# Patient Record
Sex: MALE | Race: WHITE | NOT HISPANIC OR LATINO
[De-identification: names, ages, dates, MRNs, and addresses within clinical notes are randomized per-mention and may not be internally consistent; named-entity substitution may affect disease eponyms.]

---

## 2019-05-13 ENCOUNTER — APPOINTMENT (OUTPATIENT)
Dept: CARDIOLOGY | Facility: CLINIC | Age: 44
End: 2019-05-13

## 2019-07-16 ENCOUNTER — INPATIENT (INPATIENT)
Facility: HOSPITAL | Age: 44
LOS: 0 days | Discharge: HOME | End: 2019-07-17
Attending: ORTHOPAEDIC SURGERY | Admitting: ORTHOPAEDIC SURGERY
Payer: COMMERCIAL

## 2019-07-16 VITALS
TEMPERATURE: 99 F | OXYGEN SATURATION: 98 % | HEIGHT: 68 IN | RESPIRATION RATE: 18 BRPM | WEIGHT: 199.96 LBS | HEART RATE: 86 BPM | DIASTOLIC BLOOD PRESSURE: 88 MMHG | SYSTOLIC BLOOD PRESSURE: 120 MMHG

## 2019-07-16 VITALS
RESPIRATION RATE: 18 BRPM | SYSTOLIC BLOOD PRESSURE: 105 MMHG | TEMPERATURE: 98 F | OXYGEN SATURATION: 100 % | HEART RATE: 61 BPM | DIASTOLIC BLOOD PRESSURE: 51 MMHG

## 2019-07-16 LAB
ABO RH CONFIRMATION: SIGNIFICANT CHANGE UP
ANION GAP SERPL CALC-SCNC: 5 MMOL/L — SIGNIFICANT CHANGE UP (ref 5–17)
APTT BLD: 30.4 SEC — SIGNIFICANT CHANGE UP (ref 27.5–36.3)
BASOPHILS # BLD AUTO: 0.07 K/UL — SIGNIFICANT CHANGE UP (ref 0–0.2)
BASOPHILS NFR BLD AUTO: 0.7 % — SIGNIFICANT CHANGE UP (ref 0–2)
BLD GP AB SCN SERPL QL: SIGNIFICANT CHANGE UP
BUN SERPL-MCNC: 12 MG/DL — SIGNIFICANT CHANGE UP (ref 7–23)
CALCIUM SERPL-MCNC: 8.3 MG/DL — LOW (ref 8.5–10.1)
CHLORIDE SERPL-SCNC: 106 MMOL/L — SIGNIFICANT CHANGE UP (ref 96–108)
CO2 SERPL-SCNC: 29 MMOL/L — SIGNIFICANT CHANGE UP (ref 22–31)
CREAT SERPL-MCNC: 1.08 MG/DL — SIGNIFICANT CHANGE UP (ref 0.5–1.3)
CRP SERPL-MCNC: 0.17 MG/DL — SIGNIFICANT CHANGE UP (ref 0–0.4)
EOSINOPHIL # BLD AUTO: 0.11 K/UL — SIGNIFICANT CHANGE UP (ref 0–0.5)
EOSINOPHIL NFR BLD AUTO: 1.1 % — SIGNIFICANT CHANGE UP (ref 0–6)
ERYTHROCYTE [SEDIMENTATION RATE] IN BLOOD: 4 MM/HR — SIGNIFICANT CHANGE UP (ref 0–15)
GLUCOSE SERPL-MCNC: 105 MG/DL — HIGH (ref 70–99)
HCT VFR BLD CALC: 44.2 % — SIGNIFICANT CHANGE UP (ref 39–50)
HGB BLD-MCNC: 15.1 G/DL — SIGNIFICANT CHANGE UP (ref 13–17)
IMM GRANULOCYTES NFR BLD AUTO: 0.3 % — SIGNIFICANT CHANGE UP (ref 0–1.5)
INR BLD: 1.01 RATIO — SIGNIFICANT CHANGE UP (ref 0.88–1.16)
LYMPHOCYTES # BLD AUTO: 1.95 K/UL — SIGNIFICANT CHANGE UP (ref 1–3.3)
LYMPHOCYTES # BLD AUTO: 18.8 % — SIGNIFICANT CHANGE UP (ref 13–44)
MCHC RBC-ENTMCNC: 29.8 PG — SIGNIFICANT CHANGE UP (ref 27–34)
MCHC RBC-ENTMCNC: 34.2 GM/DL — SIGNIFICANT CHANGE UP (ref 32–36)
MCV RBC AUTO: 87.4 FL — SIGNIFICANT CHANGE UP (ref 80–100)
MONOCYTES # BLD AUTO: 0.69 K/UL — SIGNIFICANT CHANGE UP (ref 0–0.9)
MONOCYTES NFR BLD AUTO: 6.7 % — SIGNIFICANT CHANGE UP (ref 2–14)
NEUTROPHILS # BLD AUTO: 7.51 K/UL — HIGH (ref 1.8–7.4)
NEUTROPHILS NFR BLD AUTO: 72.4 % — SIGNIFICANT CHANGE UP (ref 43–77)
PLATELET # BLD AUTO: 225 K/UL — SIGNIFICANT CHANGE UP (ref 150–400)
POTASSIUM SERPL-MCNC: 4 MMOL/L — SIGNIFICANT CHANGE UP (ref 3.5–5.3)
POTASSIUM SERPL-SCNC: 4 MMOL/L — SIGNIFICANT CHANGE UP (ref 3.5–5.3)
PROTHROM AB SERPL-ACNC: 11.2 SEC — SIGNIFICANT CHANGE UP (ref 10–12.9)
RBC # BLD: 5.06 M/UL — SIGNIFICANT CHANGE UP (ref 4.2–5.8)
RBC # FLD: 12.5 % — SIGNIFICANT CHANGE UP (ref 10.3–14.5)
SODIUM SERPL-SCNC: 140 MMOL/L — SIGNIFICANT CHANGE UP (ref 135–145)
TYPE + AB SCN PNL BLD: SIGNIFICANT CHANGE UP
WBC # BLD: 10.36 K/UL — SIGNIFICANT CHANGE UP (ref 3.8–10.5)
WBC # FLD AUTO: 10.36 K/UL — SIGNIFICANT CHANGE UP (ref 3.8–10.5)

## 2019-07-16 PROCEDURE — 71045 X-RAY EXAM CHEST 1 VIEW: CPT | Mod: 26

## 2019-07-16 PROCEDURE — 93010 ELECTROCARDIOGRAM REPORT: CPT

## 2019-07-16 PROCEDURE — 99285 EMERGENCY DEPT VISIT HI MDM: CPT

## 2019-07-16 PROCEDURE — 73130 X-RAY EXAM OF HAND: CPT | Mod: 26,LT

## 2019-07-16 RX ORDER — SIMVASTATIN 20 MG/1
40 TABLET, FILM COATED ORAL AT BEDTIME
Refills: 0 | Status: DISCONTINUED | OUTPATIENT
Start: 2019-07-16 | End: 2019-07-17

## 2019-07-16 RX ORDER — MORPHINE SULFATE 50 MG/1
4 CAPSULE, EXTENDED RELEASE ORAL ONCE
Refills: 0 | Status: DISCONTINUED | OUTPATIENT
Start: 2019-07-16 | End: 2019-07-16

## 2019-07-16 RX ORDER — TETANUS TOXOID, REDUCED DIPHTHERIA TOXOID AND ACELLULAR PERTUSSIS VACCINE, ADSORBED 5; 2.5; 8; 8; 2.5 [IU]/.5ML; [IU]/.5ML; UG/.5ML; UG/.5ML; UG/.5ML
0.5 SUSPENSION INTRAMUSCULAR ONCE
Refills: 0 | Status: COMPLETED | OUTPATIENT
Start: 2019-07-16 | End: 2019-07-16

## 2019-07-16 RX ORDER — PANTOPRAZOLE SODIUM 20 MG/1
40 TABLET, DELAYED RELEASE ORAL
Refills: 0 | Status: DISCONTINUED | OUTPATIENT
Start: 2019-07-16 | End: 2019-07-17

## 2019-07-16 RX ORDER — PANTOPRAZOLE SODIUM 20 MG/1
1 TABLET, DELAYED RELEASE ORAL
Qty: 0 | Refills: 0 | DISCHARGE

## 2019-07-16 RX ORDER — CITALOPRAM 10 MG/1
20 TABLET, FILM COATED ORAL DAILY
Refills: 0 | Status: DISCONTINUED | OUTPATIENT
Start: 2019-07-16 | End: 2019-07-17

## 2019-07-16 RX ORDER — OXYCODONE HYDROCHLORIDE 5 MG/1
10 TABLET ORAL EVERY 4 HOURS
Refills: 0 | Status: DISCONTINUED | OUTPATIENT
Start: 2019-07-16 | End: 2019-07-17

## 2019-07-16 RX ORDER — SIMVASTATIN 20 MG/1
1 TABLET, FILM COATED ORAL
Qty: 0 | Refills: 0 | DISCHARGE

## 2019-07-16 RX ORDER — VANCOMYCIN HCL 1 G
1500 VIAL (EA) INTRAVENOUS EVERY 12 HOURS
Refills: 0 | Status: DISCONTINUED | OUTPATIENT
Start: 2019-07-16 | End: 2019-07-17

## 2019-07-16 RX ORDER — KETOROLAC TROMETHAMINE 30 MG/ML
15 SYRINGE (ML) INJECTION ONCE
Refills: 0 | Status: DISCONTINUED | OUTPATIENT
Start: 2019-07-16 | End: 2019-07-16

## 2019-07-16 RX ORDER — DOCUSATE SODIUM 100 MG
100 CAPSULE ORAL THREE TIMES A DAY
Refills: 0 | Status: DISCONTINUED | OUTPATIENT
Start: 2019-07-16 | End: 2019-07-17

## 2019-07-16 RX ORDER — DIVALPROEX SODIUM 500 MG/1
500 TABLET, DELAYED RELEASE ORAL ONCE
Refills: 0 | Status: COMPLETED | OUTPATIENT
Start: 2019-07-16 | End: 2019-07-16

## 2019-07-16 RX ORDER — PIPERACILLIN AND TAZOBACTAM 4; .5 G/20ML; G/20ML
3.38 INJECTION, POWDER, LYOPHILIZED, FOR SOLUTION INTRAVENOUS ONCE
Refills: 0 | Status: COMPLETED | OUTPATIENT
Start: 2019-07-16 | End: 2019-07-16

## 2019-07-16 RX ORDER — ACETAMINOPHEN 500 MG
975 TABLET ORAL ONCE
Refills: 0 | Status: COMPLETED | OUTPATIENT
Start: 2019-07-16 | End: 2019-07-16

## 2019-07-16 RX ORDER — ARIPIPRAZOLE 15 MG/1
2 TABLET ORAL DAILY
Refills: 0 | Status: DISCONTINUED | OUTPATIENT
Start: 2019-07-16 | End: 2019-07-16

## 2019-07-16 RX ORDER — QUETIAPINE FUMARATE 200 MG/1
50 TABLET, FILM COATED ORAL ONCE
Refills: 0 | Status: COMPLETED | OUTPATIENT
Start: 2019-07-16 | End: 2019-07-16

## 2019-07-16 RX ORDER — ARIPIPRAZOLE 15 MG/1
2 TABLET ORAL
Qty: 0 | Refills: 0 | DISCHARGE

## 2019-07-16 RX ORDER — PIPERACILLIN AND TAZOBACTAM 4; .5 G/20ML; G/20ML
3.38 INJECTION, POWDER, LYOPHILIZED, FOR SOLUTION INTRAVENOUS EVERY 8 HOURS
Refills: 0 | Status: DISCONTINUED | OUTPATIENT
Start: 2019-07-16 | End: 2019-07-17

## 2019-07-16 RX ORDER — ARIPIPRAZOLE 15 MG/1
4 TABLET ORAL DAILY
Refills: 0 | Status: DISCONTINUED | OUTPATIENT
Start: 2019-07-16 | End: 2019-07-17

## 2019-07-16 RX ORDER — OXYCODONE HYDROCHLORIDE 5 MG/1
5 TABLET ORAL EVERY 4 HOURS
Refills: 0 | Status: DISCONTINUED | OUTPATIENT
Start: 2019-07-16 | End: 2019-07-17

## 2019-07-16 RX ORDER — IBUPROFEN 200 MG
600 TABLET ORAL EVERY 8 HOURS
Refills: 0 | Status: DISCONTINUED | OUTPATIENT
Start: 2019-07-16 | End: 2019-07-17

## 2019-07-16 RX ORDER — IBUPROFEN 200 MG
600 TABLET ORAL ONCE
Refills: 0 | Status: COMPLETED | OUTPATIENT
Start: 2019-07-16 | End: 2019-07-16

## 2019-07-16 RX ORDER — CITALOPRAM 10 MG/1
1 TABLET, FILM COATED ORAL
Qty: 0 | Refills: 0 | DISCHARGE

## 2019-07-16 RX ADMIN — PIPERACILLIN AND TAZOBACTAM 25 GRAM(S): 4; .5 INJECTION, POWDER, LYOPHILIZED, FOR SOLUTION INTRAVENOUS at 21:27

## 2019-07-16 RX ADMIN — Medication 15 MILLIGRAM(S): at 10:30

## 2019-07-16 RX ADMIN — Medication 975 MILLIGRAM(S): at 01:03

## 2019-07-16 RX ADMIN — Medication 15 MILLIGRAM(S): at 07:47

## 2019-07-16 RX ADMIN — ARIPIPRAZOLE 4 MILLIGRAM(S): 15 TABLET ORAL at 19:36

## 2019-07-16 RX ADMIN — PIPERACILLIN AND TAZOBACTAM 200 GRAM(S): 4; .5 INJECTION, POWDER, LYOPHILIZED, FOR SOLUTION INTRAVENOUS at 13:31

## 2019-07-16 RX ADMIN — Medication 600 MILLIGRAM(S): at 04:57

## 2019-07-16 RX ADMIN — Medication 300 MILLIGRAM(S): at 15:38

## 2019-07-16 RX ADMIN — Medication 100 MILLIGRAM(S): at 21:27

## 2019-07-16 RX ADMIN — MORPHINE SULFATE 4 MILLIGRAM(S): 50 CAPSULE, EXTENDED RELEASE ORAL at 04:54

## 2019-07-16 RX ADMIN — PANTOPRAZOLE SODIUM 40 MILLIGRAM(S): 20 TABLET, DELAYED RELEASE ORAL at 19:36

## 2019-07-16 RX ADMIN — Medication 975 MILLIGRAM(S): at 07:47

## 2019-07-16 RX ADMIN — MORPHINE SULFATE 4 MILLIGRAM(S): 50 CAPSULE, EXTENDED RELEASE ORAL at 04:57

## 2019-07-16 RX ADMIN — Medication 600 MILLIGRAM(S): at 04:44

## 2019-07-16 RX ADMIN — TETANUS TOXOID, REDUCED DIPHTHERIA TOXOID AND ACELLULAR PERTUSSIS VACCINE, ADSORBED 0.5 MILLILITER(S): 5; 2.5; 8; 8; 2.5 SUSPENSION INTRAMUSCULAR at 04:44

## 2019-07-16 RX ADMIN — Medication 600 MILLIGRAM(S): at 18:18

## 2019-07-16 RX ADMIN — CITALOPRAM 20 MILLIGRAM(S): 10 TABLET, FILM COATED ORAL at 18:19

## 2019-07-16 RX ADMIN — SIMVASTATIN 40 MILLIGRAM(S): 20 TABLET, FILM COATED ORAL at 21:27

## 2019-07-16 NOTE — H&P ADULT - NSHPPHYSICALEXAM_GEN_ALL_CORE
O:  T(C): 36.9 (07-16-19 @ 10:32), Max: 37.1 (07-16-19 @ 04:09)  HR: 63 (07-16-19 @ 10:32) (62 - 86)  BP: 117/67 (07-16-19 @ 10:32) (111/63 - 120/88)  RR: 18 (07-16-19 @ 10:32) (18 - 18)  SpO2: 99% (07-16-19 @ 10:32) (97% - 99%)    Gen: NAD.  LUE:   healing small longtudinal laceration over the flexor side of index finger at the level of the PIP flexion crease.   No redness or drainage.   Mild/moderate swelling.   TTP about laceration site with minor surrounding pain about the flexor tendon sheath  Able to passively stretch to full extension. However, has diffuse pain about the flexor side with passive stretch.   intact flexor tendon.   AIN/PIN/U Intact  SILT M/U/R  Radial pulse palpable 2+    Secondary Survey: No TTP over bony prominences, SILT, palpable pulses, full/painless range of motion, compartments soft      Imaging: XR L hand and forearm. no acute fracture. minor soft tissue swelling noted.

## 2019-07-16 NOTE — ED ADULT TRIAGE NOTE - CHIEF COMPLAINT QUOTE
c/o pain & difficulty moving left 1st finger(index finger) starting this morning Pt reports cutting finger last week. Skin intact.

## 2019-07-16 NOTE — ED ADULT NURSE NOTE - ED STAT RN HANDOFF DETAILS
received pt from prior RN alert and oriented. Updated on POC and verbalizes understanding.  Awaiting further results and ortho at this time.  Will cont to monitor.

## 2019-07-16 NOTE — H&P ADULT - HISTORY OF PRESENT ILLNESS
HPI: 44y year old Male RHD s/p laceration from glass to L index finger over flexor side about 1 week ago. States symptoms were improving with mild pain, but golfed yesterday and woke up this am with worsening pain. Pain diffusely about finger. Painful to extend digit. No drainage or redness. No fevers or chills. Otherwise feels well. Patient denies pain in any other joint, numbness, tingling, paresthesias. No pain in any other extremities.

## 2019-07-16 NOTE — ED ADULT NURSE NOTE - NSIMPLEMENTINTERV_GEN_ALL_ED
Implemented All Universal Safety Interventions:  [place] to call system. Call bell, personal items and telephone within reach. Instruct patient to call for assistance. Room bathroom lighting operational. Non-slip footwear when patient is off stretcher. Physically safe environment: no spills, clutter or unnecessary equipment. Stretcher in lowest position, wheels locked, appropriate side rails in place.

## 2019-07-16 NOTE — CONSULT NOTE ADULT - SUBJECTIVE AND OBJECTIVE BOX
c/c: left index finger pain/swelling/redness    HPI: 44M, pmh of depression, gerd, HLD who presented to the ER with acute left first digit pain/swelling X 1 day. He had cut his finger about a week ago on glass. Subsequently thought it was healing well. He even played golf. Yesterday, he had acute onset pain/swelling and redness. He came to the ED and is now admitted to ortho with early flexor tenosynovitis. Hospitalist service consulted for medical comanagement, clearance for possible OR. Pt feels better after receiving pain meds. No fevers but had chills last night. No n/v/d/abd abd pain. no cp/sob. Able to climb flights of stairs without difficulty. Does not exercise regularly. no h/o htn, dm, stroke.     ROS: all 10 systems reviewed and is as above otherwise negative.     PMH as above  PSH: right shoulder surgery  F/H: denies significant medical conditions in immediate family members  Social: no tobacco use, has a beer daily  Allergies: NKDA  Home meds: see med rec    Vital Signs Last 24 Hrs  T(C): 37 (16 Jul 2019 15:10), Max: 37.1 (16 Jul 2019 04:09)  T(F): 98.6 (16 Jul 2019 15:10), Max: 98.7 (16 Jul 2019 04:09)  HR: 62 (16 Jul 2019 15:10) (60 - 86)  BP: 97/70 (16 Jul 2019 15:10) (97/70 - 121/81)  RR: 17 (16 Jul 2019 15:10) (16 - 18)  SpO2: 100% (16 Jul 2019 15:10) (97% - 100%)    PHYSICAL EXAM:    GENERAL: Comfortable, no acute distress   HEAD:  Normocephalic, atraumatic  EYES: EOMI, PERRLA  HEENT: Moist mucous membranes  NECK: Supple, No JVD  NERVOUS SYSTEM:  Alert & Oriented X3, Motor Strength 5/5 B/L upper and lower extremities  CHEST/LUNG: Clear to auscultation bilaterally  HEART: Regular rate and rhythm  ABDOMEN: Soft, Nontender, Nondistended, Bowel sounds present  GENITOURINARY: Voiding, no palpable bladder  EXTREMITIES:   No clubbing, cyanosis, or edema  MUSCULOSKELTAL- Left index finger with healing laceration, redness/swelling/tenderness, difficulty with flexion  SKIN-no rash    LABS:                        15.1   10.36 )-----------( 225      ( 16 Jul 2019 04:48 )             44.2     07-16    140  |  106  |  12  ----------------------------<  105<H>  4.0   |  29  |  1.08    Ca    8.3<L>      16 Jul 2019 04:48      PT/INR - ( 16 Jul 2019 10:11 )   PT: 11.2 sec;   INR: 1.01 ratio         PTT - ( 16 Jul 2019 10:11 )  PTT:30.4 sec    ekg: sinus, no acute ischemic changes     Xray Hand 3 Views, Left (07.16.19 @ 09:53) >  IMPRESSION:     No evidence of acute bony articular abnormality in the left second digit   or left hand.

## 2019-07-16 NOTE — H&P ADULT - ASSESSMENT
A/P 44M with L index finger early FTS     Potentially early FTS given mechanism of injury, pain with passive stretch and spreading pain distal/proximal to laceration site about the flexor side of digit. Mild fusiform swelling. No redness or drainage. No fevers or chills. Unlikely to benefit at this time for I+D. However, will admit for IV abx and close monitoring. If any clinical signs of worsening condition or lack of improvement overnight, will potentially pursue I+D tomorrow in the OR.   Pain Control, nsaids  Start Vanco/Zosyn  Hold AC  NPO after midnight tonight  Ice, elevate LUE  Will monitor as noted.   DW Dr. Ramírez, agrees with above.

## 2019-07-16 NOTE — ED ADULT NURSE NOTE - OBJECTIVE STATEMENT
Pt comes in with left hand swelling. Pt states he cut his hand earlier in the week on glass. Pt states that he went golfing today and prior to golf, pt had no pain or swelling, after golf is when pain and swelling started. Pt denies trauma to area, denies fevers.

## 2019-07-16 NOTE — ED PROVIDER NOTE - CLINICAL SUMMARY MEDICAL DECISION MAKING FREE TEXT BOX
pt with swelling and ttp of left index finger s/p cutting on glass 3 days ago, will get xray r/o fb, give pain medication and get hand consult for possible flexor tenosynovitis, tdap and antibiotics

## 2019-07-16 NOTE — ED PROVIDER NOTE - OBJECTIVE STATEMENT
45 yo male pw left index finger pain and swelling. 3 days ago pt cut finger on a glass candle vogt, he played golf today and woke up in excruciating pain, unable to move the finger. no fever or chills

## 2019-07-16 NOTE — ED PROVIDER NOTE - MUSCULOSKELETAL, MLM
Spine appears normal, range of motion is not limited,  left index finger in slightly flexed position, pain with passive extension, superficial laceration with no erythema or drainage to volar aspect of finger. +2 radial pulse and swelling to index

## 2019-07-16 NOTE — ED ADULT NURSE REASSESSMENT NOTE - NS ED NURSE REASSESS COMMENT FT1
pt agitated, unsure of plan. call out to ortho placed to speak with patient. Management made aware. will place another call to ortho. MD Bone at bedside, will place admission

## 2019-07-16 NOTE — PATIENT PROFILE ADULT - MONEY FOR FOOD
Naomi Adan NP came to assess patient at bedside. Instructed to check patient's bleeding in 1 hour. NP said to keep monitoring as she was seen by NP on the 21st. No further action required no

## 2019-07-16 NOTE — ED ADULT NURSE REASSESSMENT NOTE - NS ED NURSE REASSESS COMMENT FT1
Patient received from previous nurse. Pt is A&Ox4, VSS on RA. Pt is resting comfortably in their bed at this time, denies any pain or discomfort at this time. Pt stated that he is able to bend his finger without any pain at this time. Safety and comfort measures in place. Hourly rounding will be done on my time. Will continue to monitor.

## 2019-07-16 NOTE — ED ADULT NURSE REASSESSMENT NOTE - NS ED NURSE REASSESS COMMENT FT1
Ortho at bedside, ortho spoke with patient with 3 options, told patient they will come back to speak with patient. Spoke again with patient wife. call out to ortho for plan.

## 2019-07-16 NOTE — ED PROVIDER NOTE - PROGRESS NOTE DETAILS
spoke with Dr. Ramírez on call for hand, paged ortho resident concern for flexortenosynovitis. recommended to hold antibiotics for now. no fever, no redness. NORMA Duran DO pt endorsed to me from dr hernandez, ortho will admit for possible flexor tenosynovitis

## 2019-07-16 NOTE — CONSULT NOTE ADULT - ASSESSMENT
44M, pmh as above a/w:    1. Left index finger tenosynovitis:  -pain control  -nsaids  -abx  -possible or if no improvement.   -if OR needed, no medical contraindications at this time.     2. Depression:  -mood stable  -continue abilify/citalopram    3. HLD:  -statin    4. GERD:  -PPI    5. DVT px    thank you will follow

## 2019-07-16 NOTE — ED ADULT NURSE REASSESSMENT NOTE - NS ED NURSE REASSESS COMMENT FT1
Pt is resting in his bed at this time. Hand MD coming to evaluate the patient. Offered patient a menu pt stated "I will not eat hospital food, I will eat tomorrow." Safety and comfort measures in place. Hourly rounding will be done on my time. Will continue to monitor.

## 2019-07-16 NOTE — ED ADULT NURSE REASSESSMENT NOTE - NS ED NURSE REASSESS COMMENT FT1
Patient resting comfortably in bed no complaints any pain or discomfort. VSS. Iv intact and patent. Will continue to monitor

## 2019-07-17 ENCOUNTER — TRANSCRIPTION ENCOUNTER (OUTPATIENT)
Age: 44
End: 2019-07-17

## 2019-07-17 LAB
ANION GAP SERPL CALC-SCNC: 10 MMOL/L — SIGNIFICANT CHANGE UP (ref 5–17)
APTT BLD: 30.5 SEC — SIGNIFICANT CHANGE UP (ref 27.5–36.3)
BUN SERPL-MCNC: 12 MG/DL — SIGNIFICANT CHANGE UP (ref 7–23)
CALCIUM SERPL-MCNC: 8.7 MG/DL — SIGNIFICANT CHANGE UP (ref 8.5–10.1)
CHLORIDE SERPL-SCNC: 105 MMOL/L — SIGNIFICANT CHANGE UP (ref 96–108)
CO2 SERPL-SCNC: 27 MMOL/L — SIGNIFICANT CHANGE UP (ref 22–31)
CREAT SERPL-MCNC: 1.34 MG/DL — HIGH (ref 0.5–1.3)
GLUCOSE SERPL-MCNC: 143 MG/DL — HIGH (ref 70–99)
HCT VFR BLD CALC: 43.9 % — SIGNIFICANT CHANGE UP (ref 39–50)
HGB BLD-MCNC: 15 G/DL — SIGNIFICANT CHANGE UP (ref 13–17)
INR BLD: 1.03 RATIO — SIGNIFICANT CHANGE UP (ref 0.88–1.16)
MCHC RBC-ENTMCNC: 29.8 PG — SIGNIFICANT CHANGE UP (ref 27–34)
MCHC RBC-ENTMCNC: 34.2 GM/DL — SIGNIFICANT CHANGE UP (ref 32–36)
MCV RBC AUTO: 87.3 FL — SIGNIFICANT CHANGE UP (ref 80–100)
PLATELET # BLD AUTO: 185 K/UL — SIGNIFICANT CHANGE UP (ref 150–400)
POTASSIUM SERPL-MCNC: 4 MMOL/L — SIGNIFICANT CHANGE UP (ref 3.5–5.3)
POTASSIUM SERPL-SCNC: 4 MMOL/L — SIGNIFICANT CHANGE UP (ref 3.5–5.3)
PROTHROM AB SERPL-ACNC: 11.4 SEC — SIGNIFICANT CHANGE UP (ref 10–12.9)
RBC # BLD: 5.03 M/UL — SIGNIFICANT CHANGE UP (ref 4.2–5.8)
RBC # FLD: 12.6 % — SIGNIFICANT CHANGE UP (ref 10.3–14.5)
SODIUM SERPL-SCNC: 142 MMOL/L — SIGNIFICANT CHANGE UP (ref 135–145)
WBC # BLD: 7.26 K/UL — SIGNIFICANT CHANGE UP (ref 3.8–10.5)
WBC # FLD AUTO: 7.26 K/UL — SIGNIFICANT CHANGE UP (ref 3.8–10.5)

## 2019-07-17 RX ORDER — AZTREONAM 2 G
1 VIAL (EA) INJECTION
Qty: 20 | Refills: 0
Start: 2019-07-17 | End: 2019-07-26

## 2019-07-17 RX ORDER — ONDANSETRON 8 MG/1
4 TABLET, FILM COATED ORAL ONCE
Refills: 0 | Status: COMPLETED | OUTPATIENT
Start: 2019-07-17 | End: 2019-07-17

## 2019-07-17 RX ADMIN — Medication 600 MILLIGRAM(S): at 05:30

## 2019-07-17 RX ADMIN — Medication 600 MILLIGRAM(S): at 05:23

## 2019-07-17 RX ADMIN — Medication 300 MILLIGRAM(S): at 02:13

## 2019-07-17 RX ADMIN — PIPERACILLIN AND TAZOBACTAM 25 GRAM(S): 4; .5 INJECTION, POWDER, LYOPHILIZED, FOR SOLUTION INTRAVENOUS at 05:23

## 2019-07-17 RX ADMIN — ONDANSETRON 4 MILLIGRAM(S): 8 TABLET, FILM COATED ORAL at 05:23

## 2019-07-17 RX ADMIN — Medication 100 MILLIGRAM(S): at 05:23

## 2019-07-17 NOTE — DISCHARGE NOTE PROVIDER - NSDCCPCAREPLAN_GEN_ALL_CORE_FT
PRINCIPAL DISCHARGE DIAGNOSIS  Diagnosis: Flexor tenosynovitis of finger  Assessment and Plan of Treatment: 1. keep wound cdi  2. bactrim ds x 10 days  3. follow up dr. galloway in 3-4 days to monitor for improvement.   4. call with questions or worsening sx.

## 2019-07-17 NOTE — DISCHARGE NOTE PROVIDER - HOSPITAL COURSE
The patient is a 44 year old M admitted for IV abx and monitoring for L index finger early FTS. Started on vanco/zosyn for broad spectrum abx. Improved on HD #2 without any concerns for surgical necessity. Seen by Dr. Ramírez, advised for DC home with bactrim.

## 2019-07-17 NOTE — PROGRESS NOTE ADULT - SUBJECTIVE AND OBJECTIVE BOX
No new complaints.   Pain slightly improved.  No overnight events.     Vital Signs Last 24 Hrs  T(C): 36.8 (16 Jul 2019 18:55), Max: 37.1 (16 Jul 2019 18:19)  T(F): 98.2 (16 Jul 2019 18:55), Max: 98.7 (16 Jul 2019 18:19)  HR: 61 (16 Jul 2019 18:55) (60 - 63)  BP: 105/51 (16 Jul 2019 18:55) (97/70 - 121/81)  BP(mean): --  RR: 18 (16 Jul 2019 18:55) (16 - 18)  SpO2: 100% (16 Jul 2019 18:55) (97% - 100%)      Physical Exam:     	Gen: NAD.  	LUE:   	healing small longtudinal laceration over the flexor side of index finger at the level of the PIP flexion crease.   	No redness or drainage.   	Mild swelling.   	TTP about laceration site with minor surrounding pain about the flexor tendon sheath  	Able to passively stretch to full extension. However, has diffuse pain about the flexor side with passive stretch.   	intact flexor tendon.   	AIN/PIN/U Intact  	SILT M/U/R  	Radial pulse palpable 2+    	Secondary Survey: No TTP over bony prominences, SILT, palpable pulses, full/painless range of motion, compartments soft      	Imaging: XR L hand and forearm. no acute fracture. minor soft tissue swelling noted.      A/P 44M with L index finger early FTS     Slightly improved clinically.   Pain Control, nsaids  Vanco/Zosyn  Hold AC  NPO   Ice, elevate LUE  Plan to see with Dr. Ramírez today to determine if any operative intervention. No new complaints.   Pain  improved.  No overnight events.     Vital Signs Last 24 Hrs  T(C): 36.8 (16 Jul 2019 18:55), Max: 37.1 (16 Jul 2019 18:19)  T(F): 98.2 (16 Jul 2019 18:55), Max: 98.7 (16 Jul 2019 18:19)  HR: 61 (16 Jul 2019 18:55) (60 - 63)  BP: 105/51 (16 Jul 2019 18:55) (97/70 - 121/81)  BP(mean): --  RR: 18 (16 Jul 2019 18:55) (16 - 18)  SpO2: 100% (16 Jul 2019 18:55) (97% - 100%)      Physical Exam:     	Gen: NAD.  	LUE:   	healing small longtudinal laceration over the flexor side of index finger at the level of the PIP flexion crease.   	No redness or drainage.   	Mild swelling.   	TTP about laceration site with minor surrounding pain about the flexor tendon sheath  	Able to passively stretch to full extension. However, has diffuse pain about the flexor side with passive stretch.   	intact flexor tendon.   	AIN/PIN/U Intact  	SILT M/U/R  	Radial pulse palpable 2+    	Secondary Survey: No TTP over bony prominences, SILT, palpable pulses, full/painless range of motion, compartments soft      	Imaging: XR L hand and forearm. no acute fracture. minor soft tissue swelling noted.      A/P 44M with L index finger early FTS     Improved clinically.   Pain Control, nsaids  Vanco/Zosyn finish dose this  Ice, elevate LUE  Seen with Dr. Ramírez. Much improved. Plan for DC home with bactrim x 10 days. Follow up in 3-4 days with Dr. Ramírez

## 2019-07-17 NOTE — DISCHARGE NOTE NURSING/CASE MANAGEMENT/SOCIAL WORK - NSDCDPATPORTLINK_GEN_ALL_CORE
You can access the [facility] Patient Portal, offered by [facility], by registering with the following website: [email or web address]

## 2019-07-17 NOTE — DISCHARGE NOTE PROVIDER - CARE PROVIDER_API CALL
Frankie Ramírez)  Orthopaedic Surgery  [address], [address]  [place] [address]  Phone: [phone]  Fax: [phone]  Follow Up Time:
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
## 2019-07-21 DIAGNOSIS — S66.111A: ICD-10-CM

## 2019-07-21 DIAGNOSIS — E78.5 HYPERLIPIDEMIA, UNSPECIFIED: ICD-10-CM
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
## 2019-07-21 DIAGNOSIS — K21.9 GASTRO-ESOPHAGEAL REFLUX DISEASE WITHOUT ESOPHAGITIS: ICD-10-CM

## 2019-07-21 DIAGNOSIS — F32.9 MAJOR DEPRESSIVE DISORDER, SINGLE EPISODE, UNSPECIFIED: ICD-10-CM

## 2019-07-21 LAB
CULTURE RESULTS: SIGNIFICANT CHANGE UP
CULTURE RESULTS: SIGNIFICANT CHANGE UP
SPECIMEN SOURCE: SIGNIFICANT CHANGE UP
SPECIMEN SOURCE: SIGNIFICANT CHANGE UP

## 2021-02-17 ENCOUNTER — OUTPATIENT (OUTPATIENT)
Dept: OUTPATIENT SERVICES | Facility: HOSPITAL | Age: 46
LOS: 1 days | End: 2021-02-17
Payer: COMMERCIAL

## 2021-02-17 DIAGNOSIS — Z20.828 CONTACT WITH AND (SUSPECTED) EXPOSURE TO OTHER VIRAL COMMUNICABLE DISEASES: ICD-10-CM

## 2021-02-17 LAB — SARS-COV-2 RNA SPEC QL NAA+PROBE: SIGNIFICANT CHANGE UP

## 2021-02-17 PROCEDURE — U0003: CPT

## 2021-02-17 PROCEDURE — C9803: CPT

## 2021-02-17 PROCEDURE — U0005: CPT

## 2021-02-18 DIAGNOSIS — Z20.828 CONTACT WITH AND (SUSPECTED) EXPOSURE TO OTHER VIRAL COMMUNICABLE DISEASES: ICD-10-CM

## 2021-02-20 ENCOUNTER — OUTPATIENT (OUTPATIENT)
Dept: OUTPATIENT SERVICES | Facility: HOSPITAL | Age: 46
LOS: 1 days | End: 2021-02-20
Payer: COMMERCIAL

## 2021-02-20 DIAGNOSIS — Z20.828 CONTACT WITH AND (SUSPECTED) EXPOSURE TO OTHER VIRAL COMMUNICABLE DISEASES: ICD-10-CM

## 2021-02-20 LAB — SARS-COV-2 RNA SPEC QL NAA+PROBE: SIGNIFICANT CHANGE UP

## 2021-02-20 PROCEDURE — U0005: CPT

## 2021-02-20 PROCEDURE — C9803: CPT

## 2021-02-20 PROCEDURE — U0003: CPT

## 2021-02-21 DIAGNOSIS — Z20.828 CONTACT WITH AND (SUSPECTED) EXPOSURE TO OTHER VIRAL COMMUNICABLE DISEASES: ICD-10-CM

## 2021-02-23 ENCOUNTER — OUTPATIENT (OUTPATIENT)
Dept: OUTPATIENT SERVICES | Facility: HOSPITAL | Age: 46
LOS: 1 days | End: 2021-02-23
Payer: COMMERCIAL

## 2021-02-23 DIAGNOSIS — Z20.828 CONTACT WITH AND (SUSPECTED) EXPOSURE TO OTHER VIRAL COMMUNICABLE DISEASES: ICD-10-CM

## 2021-02-23 LAB — SARS-COV-2 RNA SPEC QL NAA+PROBE: DETECTED

## 2021-02-23 PROCEDURE — U0005: CPT

## 2021-02-23 PROCEDURE — U0003: CPT

## 2021-02-23 PROCEDURE — C9803: CPT

## 2021-02-24 DIAGNOSIS — Z20.828 CONTACT WITH AND (SUSPECTED) EXPOSURE TO OTHER VIRAL COMMUNICABLE DISEASES: ICD-10-CM

## 2021-03-16 ENCOUNTER — OUTPATIENT (OUTPATIENT)
Dept: OUTPATIENT SERVICES | Facility: HOSPITAL | Age: 46
LOS: 1 days | End: 2021-03-16
Payer: COMMERCIAL

## 2021-03-16 DIAGNOSIS — Z20.828 CONTACT WITH AND (SUSPECTED) EXPOSURE TO OTHER VIRAL COMMUNICABLE DISEASES: ICD-10-CM

## 2021-03-16 LAB — SARS-COV-2 RNA SPEC QL NAA+PROBE: DETECTED

## 2021-03-16 PROCEDURE — U0003: CPT

## 2021-03-16 PROCEDURE — C9803: CPT

## 2021-03-16 PROCEDURE — U0005: CPT

## 2021-03-17 DIAGNOSIS — Z20.828 CONTACT WITH AND (SUSPECTED) EXPOSURE TO OTHER VIRAL COMMUNICABLE DISEASES: ICD-10-CM

## 2021-03-26 ENCOUNTER — OUTPATIENT (OUTPATIENT)
Dept: OUTPATIENT SERVICES | Facility: HOSPITAL | Age: 46
LOS: 1 days | End: 2021-03-26
Payer: COMMERCIAL

## 2021-03-26 DIAGNOSIS — Z20.828 CONTACT WITH AND (SUSPECTED) EXPOSURE TO OTHER VIRAL COMMUNICABLE DISEASES: ICD-10-CM

## 2021-03-26 LAB — SARS-COV-2 RNA SPEC QL NAA+PROBE: SIGNIFICANT CHANGE UP

## 2021-03-26 PROCEDURE — U0005: CPT

## 2021-03-26 PROCEDURE — C9803: CPT

## 2021-03-26 PROCEDURE — U0003: CPT

## 2021-03-27 DIAGNOSIS — Z20.828 CONTACT WITH AND (SUSPECTED) EXPOSURE TO OTHER VIRAL COMMUNICABLE DISEASES: ICD-10-CM

## 2021-03-29 ENCOUNTER — OUTPATIENT (OUTPATIENT)
Dept: OUTPATIENT SERVICES | Facility: HOSPITAL | Age: 46
LOS: 1 days | End: 2021-03-29
Payer: COMMERCIAL

## 2021-03-29 DIAGNOSIS — Z20.828 CONTACT WITH AND (SUSPECTED) EXPOSURE TO OTHER VIRAL COMMUNICABLE DISEASES: ICD-10-CM

## 2021-03-29 LAB — SARS-COV-2 RNA SPEC QL NAA+PROBE: DETECTED

## 2021-03-29 PROCEDURE — C9803: CPT

## 2021-03-29 PROCEDURE — U0005: CPT

## 2021-03-29 PROCEDURE — U0003: CPT

## 2021-03-30 DIAGNOSIS — Z20.828 CONTACT WITH AND (SUSPECTED) EXPOSURE TO OTHER VIRAL COMMUNICABLE DISEASES: ICD-10-CM

## 2021-05-25 ENCOUNTER — NON-APPOINTMENT (OUTPATIENT)
Age: 46
End: 2021-05-25

## 2021-06-09 ENCOUNTER — APPOINTMENT (OUTPATIENT)
Dept: CARDIOLOGY | Facility: CLINIC | Age: 46
End: 2021-06-09

## 2021-10-06 ENCOUNTER — APPOINTMENT (OUTPATIENT)
Dept: CARDIOLOGY | Facility: CLINIC | Age: 46
End: 2021-10-06

## 2022-01-11 ENCOUNTER — APPOINTMENT (OUTPATIENT)
Dept: CARDIOLOGY | Facility: CLINIC | Age: 47
End: 2022-01-11

## 2022-02-07 NOTE — PATIENT PROFILE ADULT - SAFE PLACE TO LIVE
PLEASE CHANGE DRESSING ON 2/8/22. CLEAN INCISION WITH ALCOHOL AND APPLY NEW AQUACEL PROVIDED. LEAVE IN PLACE FOR 3 DAYS UNTIL 2/12/22. PLEASE BEGIN DAILY DRESSINGS ON 2/12/22.   PLEASE TAKE ELIQUIS AS ORDERED, THIS IS TO PREVENT BLOOD CLOTS, DO NOT TAKE THE ELIQUIS WITH ANY OTHER BLOOD THINNERS OR ASPIRIN.   PLEASE REFER TO HANDOUT FOR FURTHER INSTRUCTIONS  
no

## 2022-04-19 ENCOUNTER — APPOINTMENT (OUTPATIENT)
Dept: CARDIOLOGY | Facility: CLINIC | Age: 47
End: 2022-04-19
Payer: COMMERCIAL

## 2022-04-19 ENCOUNTER — NON-APPOINTMENT (OUTPATIENT)
Age: 47
End: 2022-04-19

## 2022-04-19 VITALS
BODY MASS INDEX: 27.28 KG/M2 | HEART RATE: 93 BPM | DIASTOLIC BLOOD PRESSURE: 68 MMHG | WEIGHT: 180 LBS | OXYGEN SATURATION: 97 % | HEIGHT: 68 IN | SYSTOLIC BLOOD PRESSURE: 104 MMHG

## 2022-04-19 DIAGNOSIS — F41.9 ANXIETY DISORDER, UNSPECIFIED: ICD-10-CM

## 2022-04-19 DIAGNOSIS — Z87.898 PERSONAL HISTORY OF OTHER SPECIFIED CONDITIONS: ICD-10-CM

## 2022-04-19 DIAGNOSIS — R09.89 OTHER SPECIFIED SYMPTOMS AND SIGNS INVOLVING THE CIRCULATORY AND RESPIRATORY SYSTEMS: ICD-10-CM

## 2022-04-19 DIAGNOSIS — R07.89 OTHER CHEST PAIN: ICD-10-CM

## 2022-04-19 PROCEDURE — 99215 OFFICE O/P EST HI 40 MIN: CPT

## 2022-04-19 PROCEDURE — 93000 ELECTROCARDIOGRAM COMPLETE: CPT

## 2022-04-19 RX ORDER — ARIPIPRAZOLE 5 MG/1
5 TABLET ORAL
Qty: 30 | Refills: 0 | Status: ACTIVE | COMMUNITY
Start: 2021-05-18

## 2022-04-26 ENCOUNTER — APPOINTMENT (OUTPATIENT)
Dept: CARDIOLOGY | Facility: CLINIC | Age: 47
End: 2022-04-26
Payer: COMMERCIAL

## 2022-04-26 LAB
25(OH)D3 SERPL-MCNC: 36.6 NG/ML
ALBUMIN SERPL ELPH-MCNC: 4.6 G/DL
ALP BLD-CCNC: 56 U/L
ALT SERPL-CCNC: 13 U/L
ANION GAP SERPL CALC-SCNC: 12 MMOL/L
AST SERPL-CCNC: 16 U/L
BASOPHILS # BLD AUTO: 0.09 K/UL
BASOPHILS NFR BLD AUTO: 1.6 %
BILIRUB SERPL-MCNC: 0.3 MG/DL
BUN SERPL-MCNC: 11 MG/DL
CALCIUM SERPL-MCNC: 9.4 MG/DL
CHLORIDE SERPL-SCNC: 105 MMOL/L
CHOLEST SERPL-MCNC: 234 MG/DL
CO2 SERPL-SCNC: 24 MMOL/L
CREAT SERPL-MCNC: 0.79 MG/DL
EGFR: 110 ML/MIN/1.73M2
EOSINOPHIL # BLD AUTO: 0.29 K/UL
EOSINOPHIL NFR BLD AUTO: 5.1 %
GLUCOSE SERPL-MCNC: 92 MG/DL
HCT VFR BLD CALC: 49.2 %
HDLC SERPL-MCNC: 59 MG/DL
HGB BLD-MCNC: 16 G/DL
IMM GRANULOCYTES NFR BLD AUTO: 0.4 %
LDLC SERPL CALC-MCNC: 152 MG/DL
LYMPHOCYTES # BLD AUTO: 2.02 K/UL
LYMPHOCYTES NFR BLD AUTO: 35.5 %
MAN DIFF?: NORMAL
MCHC RBC-ENTMCNC: 29.4 PG
MCHC RBC-ENTMCNC: 32.5 GM/DL
MCV RBC AUTO: 90.4 FL
MONOCYTES # BLD AUTO: 0.59 K/UL
MONOCYTES NFR BLD AUTO: 10.4 %
NEUTROPHILS # BLD AUTO: 2.68 K/UL
NEUTROPHILS NFR BLD AUTO: 47 %
NONHDLC SERPL-MCNC: 175 MG/DL
PLATELET # BLD AUTO: 274 K/UL
POTASSIUM SERPL-SCNC: 5 MMOL/L
PROT SERPL-MCNC: 6.7 G/DL
RBC # BLD: 5.44 M/UL
RBC # FLD: 13.3 %
SODIUM SERPL-SCNC: 141 MMOL/L
TRIGL SERPL-MCNC: 113 MG/DL
TSH SERPL-ACNC: 0.98 UIU/ML
WBC # FLD AUTO: 5.69 K/UL

## 2022-04-26 PROCEDURE — 93880 EXTRACRANIAL BILAT STUDY: CPT

## 2022-04-27 LAB
ESTIMATED AVERAGE GLUCOSE: 120 MG/DL
HBA1C MFR BLD HPLC: 5.8 %

## 2022-09-18 ENCOUNTER — EMERGENCY (EMERGENCY)
Facility: HOSPITAL | Age: 47
LOS: 0 days | Discharge: LEFT BEFORE DISCHARGE | End: 2022-09-18

## 2022-09-18 VITALS — WEIGHT: 169.98 LBS | HEIGHT: 68 IN

## 2022-09-18 VITALS
TEMPERATURE: 98 F | RESPIRATION RATE: 18 BRPM | DIASTOLIC BLOOD PRESSURE: 80 MMHG | HEART RATE: 85 BPM | SYSTOLIC BLOOD PRESSURE: 102 MMHG | OXYGEN SATURATION: 96 %

## 2022-09-18 DIAGNOSIS — M25.511 PAIN IN RIGHT SHOULDER: ICD-10-CM

## 2022-09-18 DIAGNOSIS — Z53.21 PROCEDURE AND TREATMENT NOT CARRIED OUT DUE TO PATIENT LEAVING PRIOR TO BEING SEEN BY HEALTH CARE PROVIDER: ICD-10-CM

## 2022-09-18 PROCEDURE — L9991: CPT

## 2022-09-18 NOTE — ED ADULT TRIAGE NOTE - CHIEF COMPLAINT QUOTE
PT C/O RIGHT SHOULDER PAIN. "I FEEL LIKE I HAVE A PINCHED NERVE. I CANNOT DRIVE A CAR OR MOVE WITHOUT PAIN". DENIES INJURY.

## 2022-09-18 NOTE — ED ADULT NURSE REASSESSMENT NOTE - NS ED NURSE REASSESS COMMENT FT1
pt left w/o being seen by RN (KERMIT Rodriguez) and by Dr. meek. Dr. meek made aware. Pt did not announce leaving. Charge RN ANTONI agrawal made aware. No IV in place.

## 2023-03-24 ENCOUNTER — APPOINTMENT (OUTPATIENT)
Dept: PULMONOLOGY | Facility: CLINIC | Age: 48
End: 2023-03-24
Payer: COMMERCIAL

## 2023-03-24 VITALS
HEART RATE: 83 BPM | OXYGEN SATURATION: 97 % | DIASTOLIC BLOOD PRESSURE: 78 MMHG | HEIGHT: 68 IN | BODY MASS INDEX: 28.64 KG/M2 | SYSTOLIC BLOOD PRESSURE: 113 MMHG | WEIGHT: 189 LBS

## 2023-03-24 DIAGNOSIS — F17.200 NICOTINE DEPENDENCE, UNSPECIFIED, UNCOMPLICATED: ICD-10-CM

## 2023-03-24 PROCEDURE — 99204 OFFICE O/P NEW MOD 45 MIN: CPT | Mod: 25

## 2023-03-24 PROCEDURE — ZZZZZ: CPT

## 2023-03-24 PROCEDURE — 94726 PLETHYSMOGRAPHY LUNG VOLUMES: CPT

## 2023-03-24 PROCEDURE — 71046 X-RAY EXAM CHEST 2 VIEWS: CPT

## 2023-03-24 PROCEDURE — 94729 DIFFUSING CAPACITY: CPT

## 2023-03-24 PROCEDURE — 94010 BREATHING CAPACITY TEST: CPT

## 2023-03-24 RX ORDER — PANTOPRAZOLE 40 MG/1
40 TABLET, DELAYED RELEASE ORAL
Refills: 0 | Status: ACTIVE | COMMUNITY

## 2023-03-24 NOTE — HISTORY OF PRESENT ILLNESS
[Current] : current [TextBox_4] : 48M with GERD, HLD\par \par active smoker, trying to quit\par here to eval lungs\par able to exercise\par no sob/cough/wheeze\par concerned bc COPD runs in his family\par no hospitalizations due to resp disease\par

## 2023-03-24 NOTE — ASSESSMENT
[FreeTextEntry1] : smoking cessation counseling \par fu 6 mo\par \par A total of 45 minutes was spent on this encounter including history taking, chart review, physical examination, testing interpretation and treatment plan.\par

## 2023-05-19 ENCOUNTER — RX RENEWAL (OUTPATIENT)
Age: 48
End: 2023-05-19

## 2023-05-30 ENCOUNTER — RX RENEWAL (OUTPATIENT)
Age: 48
End: 2023-05-30

## 2023-06-19 ENCOUNTER — RX RENEWAL (OUTPATIENT)
Age: 48
End: 2023-06-19

## 2023-06-27 ENCOUNTER — RX RENEWAL (OUTPATIENT)
Age: 48
End: 2023-06-27

## 2023-07-17 NOTE — PATIENT PROFILE ADULT - NSPROMUTANXFEARFT_GEN_A_NUR
Refill requested of valtrex.     Last office visit date: 01/20/23    Medication Refill Protocol Failed.  Failed criteria: pregnancy. Sent to clinician to review.           BP Readings from Last 3 Encounters:   07/14/23 130/76   07/11/23 134/82   07/05/23 138/88     Pulse Readings from Last 3 Encounters:   01/20/23 96   10/24/22 70   10/11/22 72       Hemoglobin A1C (%)   Date Value   02/02/2021 5.6     TSH (mcUnits/mL)   Date Value   05/18/2022 2.106     Lab Results   Component Value Date    CHOLESTEROL 239 (H) 05/18/2022    HDL 38 (L) 05/18/2022    CALCLDL 173 (H) 05/18/2022    TRIGLYCERIDE 138 05/18/2022     Lab Results   Component Value Date    SODIUM 134 (L) 05/16/2023    POTASSIUM 4.1 05/16/2023    CHLORIDE 105 05/16/2023    CO2 20 (L) 05/16/2023    BUN 8 05/16/2023    CREATININE 0.59 05/16/2023    GLUCOSE 121 (H) 05/16/2023     Lab Results   Component Value Date    AST 15 05/16/2023    GPT 18 05/16/2023    ALKPT 73 05/16/2023    BILIRUBIN 0.2 05/16/2023        N/A

## 2023-09-11 ENCOUNTER — APPOINTMENT (OUTPATIENT)
Dept: CARDIOLOGY | Facility: CLINIC | Age: 48
End: 2023-09-11
Payer: COMMERCIAL

## 2023-09-11 VITALS
BODY MASS INDEX: 29.25 KG/M2 | OXYGEN SATURATION: 97 % | DIASTOLIC BLOOD PRESSURE: 70 MMHG | HEIGHT: 68 IN | HEART RATE: 69 BPM | SYSTOLIC BLOOD PRESSURE: 108 MMHG | WEIGHT: 193 LBS

## 2023-09-11 DIAGNOSIS — R73.03 PREDIABETES.: ICD-10-CM

## 2023-09-11 DIAGNOSIS — E78.5 HYPERLIPIDEMIA, UNSPECIFIED: ICD-10-CM

## 2023-09-11 PROCEDURE — 99215 OFFICE O/P EST HI 40 MIN: CPT | Mod: 25

## 2023-09-11 PROCEDURE — 93000 ELECTROCARDIOGRAM COMPLETE: CPT

## 2023-09-11 RX ORDER — CITALOPRAM HYDROBROMIDE 20 MG/1
20 TABLET, FILM COATED ORAL DAILY
Refills: 0 | Status: DISCONTINUED | COMMUNITY
Start: 2021-05-18 | End: 2023-09-11

## 2023-09-18 ENCOUNTER — RX RENEWAL (OUTPATIENT)
Age: 48
End: 2023-09-18

## 2023-09-18 RX ORDER — ROSUVASTATIN CALCIUM 40 MG/1
40 TABLET, FILM COATED ORAL DAILY
Qty: 90 | Refills: 3 | Status: ACTIVE | COMMUNITY
Start: 2022-04-27

## 2023-10-19 ENCOUNTER — APPOINTMENT (OUTPATIENT)
Dept: CARDIOLOGY | Facility: CLINIC | Age: 48
End: 2023-10-19
Payer: COMMERCIAL

## 2023-10-19 DIAGNOSIS — R07.89 OTHER CHEST PAIN: ICD-10-CM

## 2023-10-19 DIAGNOSIS — R06.09 OTHER FORMS OF DYSPNEA: ICD-10-CM

## 2023-10-19 PROCEDURE — 93015 CV STRESS TEST SUPVJ I&R: CPT

## 2023-10-20 LAB
ALBUMIN SERPL ELPH-MCNC: 4.9 G/DL
ALP BLD-CCNC: 55 U/L
ALT SERPL-CCNC: 17 U/L
ANION GAP SERPL CALC-SCNC: 15 MMOL/L
AST SERPL-CCNC: 17 U/L
BILIRUB SERPL-MCNC: 0.8 MG/DL
BUN SERPL-MCNC: 12 MG/DL
CALCIUM SERPL-MCNC: 9.5 MG/DL
CHLORIDE SERPL-SCNC: 101 MMOL/L
CHOLEST SERPL-MCNC: 155 MG/DL
CO2 SERPL-SCNC: 25 MMOL/L
CREAT SERPL-MCNC: 1.07 MG/DL
EGFR: 86 ML/MIN/1.73M2
ESTIMATED AVERAGE GLUCOSE: 117 MG/DL
GLUCOSE SERPL-MCNC: 91 MG/DL
HBA1C MFR BLD HPLC: 5.7 %
HDLC SERPL-MCNC: 58 MG/DL
LDLC SERPL CALC-MCNC: 75 MG/DL
NONHDLC SERPL-MCNC: 97 MG/DL
POTASSIUM SERPL-SCNC: 4.6 MMOL/L
PROT SERPL-MCNC: 7 G/DL
SODIUM SERPL-SCNC: 141 MMOL/L
TRIGL SERPL-MCNC: 130 MG/DL

## 2024-06-14 NOTE — ED PROVIDER NOTE - PRINCIPAL DIAGNOSIS
Please inform pt he cannot mix narcotic pain medications with alprazolam or alcohol due to the potential for respiratory suppression   Flexor tenosynovitis of finger

## 2024-07-24 ENCOUNTER — APPOINTMENT (OUTPATIENT)
Dept: CARDIOLOGY | Facility: CLINIC | Age: 49
End: 2024-07-24
Payer: COMMERCIAL

## 2024-07-24 VITALS
BODY MASS INDEX: 30.01 KG/M2 | WEIGHT: 198 LBS | HEART RATE: 77 BPM | SYSTOLIC BLOOD PRESSURE: 107 MMHG | HEIGHT: 68 IN | OXYGEN SATURATION: 97 % | DIASTOLIC BLOOD PRESSURE: 72 MMHG

## 2024-07-24 DIAGNOSIS — R06.09 OTHER FORMS OF DYSPNEA: ICD-10-CM

## 2024-07-24 DIAGNOSIS — E78.5 HYPERLIPIDEMIA, UNSPECIFIED: ICD-10-CM

## 2024-07-24 PROCEDURE — 99215 OFFICE O/P EST HI 40 MIN: CPT | Mod: 25

## 2024-07-24 PROCEDURE — 93000 ELECTROCARDIOGRAM COMPLETE: CPT

## 2024-07-24 PROCEDURE — G2211 COMPLEX E/M VISIT ADD ON: CPT | Mod: NC

## 2024-07-24 RX ORDER — BUPROPION HYDROCHLORIDE 300 MG/1
300 TABLET, EXTENDED RELEASE ORAL DAILY
Refills: 0 | Status: ACTIVE | COMMUNITY

## 2024-07-24 RX ORDER — LAMOTRIGINE 25 MG/1
TABLET ORAL
Refills: 0 | Status: ACTIVE | COMMUNITY

## 2024-07-24 NOTE — HISTORY OF PRESENT ILLNESS
[FreeTextEntry1] : Mr. Jonas Coon presented to the office today for follow-up cardiac evaluation. He was accompanied to his visit here today by his wife, Akosua.  I last evaluated the patient in the office on 9/11/2023.  The patient is a 49-year-old male with a history of atypical chest discomfort, a dyslipidemia, pre-diabetes, anxiety, depression, and GERD.  The patient has been doing well from a cardiac symptomatic standpoint since his previous visit here on 9/11/2023.  Specifically, he does not describe having experienced any symptoms of chest discomfort which have been precipitated or exacerbated by physical activity.  He has not noted dyspnea on exertion in association with his activities, noting that he has recently resumed exercising.  He has not noted orthopnea, paroxysmal nocturnal dyspnea, or lower extremity edema.  He has not experienced any episodes of palpitations, presyncope or syncope.  As far as risk factors for coronary artery disease are concerned, the patient has a type IIa dyslipidemia and pre-diabetes. He does not have a history of hypertension. He discontinued cigarette smoking approximately 20 years ago, having previously smoked half a pack per day for a period of 8 years.  He resumed smoking up to a pack per day in late 2021, however, he reports having discontinued cigarette smoking entirely in December 2023.  He does not have a known immediate family history of premature coronary artery disease.  Laboratory studies performed on 10/19/2023 (on Crestor 40 mg daily) revealed cholesterol 155, triglycerides 130, HDL 58, and calculated LDL 75.  The liver chemistries were normal.  The BUN and creatinine were 12 and 1.07, respectively.  The potassium level was 4.6.  The glucose level was 91 and the hemoglobin A1c level was 5.7%.  Exercise stress testing most recently performed on 10/19/2023 revealed the patient to exhibit normal exercise tolerance, without the inducement of cardiac symptoms or electrocardiographic evidence of myocardial ischemia.  Occasional atrial premature contractions were exhibited.  Echocardiography most recently performed on 3/22/2016 revealed normal cardiac chamber sizes with normal left ventricular wall thickness and wall motion.  Left ventricular systolic function was normal, with an estimated ejection fraction of 65%.  Left ventricular diastolic function was normal.  Very mild mitral regurgitation was demonstrated.  Carotid artery Doppler testing most recently performed on 3/22/2016 revealed mild intimal thickening involving the bulbar regions bilaterally.  No significant atherosclerosis formation or stenoses were demonstrated.  Previous History:  The patient has a history of atypical chest discomfort, for which he was previously evaluated in the emergency room at [facility] in 2009 and the emergency room at [facility] on February 5, 2011. He was not found to have evidence for an acute coronary syndrome during either of these presentations, and was released from the emergency room on both occasions. I had initially evaluated the patient in the office in cardiology consultation on 3/1/11. At that time, he was referred here by his wife, Akosua, noting that his mother-in-law is also a patient of my practice (Mrs. Nhi Santillan).  Past medical history is otherwise significant for a history of prostatitis, for which he underwent a TUNA procedure a few years ago. He also reports having "interstitial cystitis". He underwent a left shoulder arthroscopic labrum repair a few years ago.

## 2024-07-24 NOTE — DISCUSSION/SUMMARY
[FreeTextEntry1] : Mr. Coon has a longstanding history of atypical chest discomfort and a dyslipidemia.  He has been doing well from a cardiac symptomatic standpoint since his previous visit here on 9/11/2023.  Specifically, he does not describe having experienced any signs or symptoms to suggest the development of an anginal syndrome, congestive heart failure, or a hemodynamically-compromising arrhythmia.  His cardiac examination today is remarkable for a faint systolic ejection murmur, in all likelihood representing an innocent flow murmur, and unchanged from his previous visit with me.  He is again exhibiting soft bilateral carotid bruits vs. a transmitted systolic cardiac murmur.  His blood pressure reading today is normal.  His electrocardiogram today reveals sinus rhythm with nonspecific diminished voltage in leads V3-V6, essentially unchanged from his previous office tracing, allowing for lead placement variation.  I have reviewed the findings of the exercise stress study of 10/19/2023, the echocardiogram of 3/22/2016, and the carotid artery Doppler study of 3/22/2016 in detail with the patient today.  I have reviewed the findings of the blood test report of 10/19/2023 in detail with the patient today.  I have advised him to continue Crestor 40 mg daily for the time being.  I am referring the patient for a follow-up fasting lipid profile and chemistry screen to assess the efficacy of the present dosage of Crestor in optimizing the lipid profile, as well as to assess for any potential adverse effects on the liver.  In addition, a hemoglobin A1c level will be obtained to reassess for pre-diabetes.  The importance of dietary modification, weight loss, and regular exercise was again emphasized to the patient today.  I have asked the patient to call me if he should have any questions or problems pertaining to these matters, and especially if he should experience any concerning symptoms. I have otherwise asked him to return to the office for follow-up cardiac evaluation in 6 months, provided he remains clinically stable in the interim. [EKG obtained to assist in diagnosis and management of assessed problem(s)] : EKG obtained to assist in diagnosis and management of assessed problem(s)

## 2024-07-24 NOTE — CARDIOLOGY SUMMARY
[de-identified] : 7/24/24 -sinus rhythm at a rate of 76 bpm.  Nonspecific diminished voltage associated with poor R wave progression in leads V3-V6.

## 2024-07-24 NOTE — PHYSICAL EXAM
[Normal Appearance] : normal appearance [General Appearance - In No Acute Distress] : no acute distress [Normal Conjunctiva] : the conjunctiva exhibited no abnormalities [No Oral Pallor] : no oral pallor [Normal Jugular Venous A Waves Present] : normal jugular venous A waves present [Normal Jugular Venous V Waves Present] : normal jugular venous V waves present [No Jugular Venous Chambers A Waves] : no jugular venous chambers A waves [Respiration, Rhythm And Depth] : normal respiratory rhythm and effort [Auscultation Breath Sounds / Voice Sounds] : lungs were clear to auscultation bilaterally [Bowel Sounds] : normal bowel sounds [Abdomen Tenderness] : non-tender [Abnormal Walk] : normal gait [Cyanosis, Localized] : no localized cyanosis [] : no rash [Oriented To Time, Place, And Person] : oriented to person, place, and time [Not Palpable] : not palpable [No Precordial Heave] : no precordial heave was noted [Normal Rate] : normal [Rhythm Regular] : regular [Normal S1] : normal S1 [Normal S2] : normal S2 [No Gallop] : no gallop heard [I] : a grade 1 [2+] : left 2+ [No Abnormalities] : the abdominal aorta was not enlarged and no bruit was heard [No Pitting Edema] : no pitting edema present [FreeTextEntry1] : moderately overweight white male [Apical Thrill] : no thrill palpable at the apex [Click] : no click [Pericardial Rub] : no pericardial rub [Right Carotid Bruit] : no bruit heard over the right carotid [Left Carotid Bruit] : no bruit heard over the left carotid

## 2024-10-08 ENCOUNTER — RX RENEWAL (OUTPATIENT)
Age: 49
End: 2024-10-08

## 2025-06-23 ENCOUNTER — RX RENEWAL (OUTPATIENT)
Age: 50
End: 2025-06-23

## 2025-08-25 ENCOUNTER — NON-APPOINTMENT (OUTPATIENT)
Age: 50
End: 2025-08-25

## 2025-08-27 ENCOUNTER — APPOINTMENT (OUTPATIENT)
Dept: CARDIOLOGY | Facility: CLINIC | Age: 50
End: 2025-08-27
Payer: COMMERCIAL

## 2025-08-27 VITALS
BODY MASS INDEX: 28.34 KG/M2 | SYSTOLIC BLOOD PRESSURE: 117 MMHG | HEART RATE: 79 BPM | WEIGHT: 187 LBS | DIASTOLIC BLOOD PRESSURE: 76 MMHG | HEIGHT: 68 IN | OXYGEN SATURATION: 96 %

## 2025-08-27 DIAGNOSIS — E78.5 HYPERLIPIDEMIA, UNSPECIFIED: ICD-10-CM

## 2025-08-27 DIAGNOSIS — R06.09 OTHER FORMS OF DYSPNEA: ICD-10-CM

## 2025-08-27 PROCEDURE — 93000 ELECTROCARDIOGRAM COMPLETE: CPT

## 2025-08-27 PROCEDURE — 99215 OFFICE O/P EST HI 40 MIN: CPT

## 2025-08-27 PROCEDURE — G2211 COMPLEX E/M VISIT ADD ON: CPT | Mod: NC
